# Patient Record
Sex: FEMALE | Race: BLACK OR AFRICAN AMERICAN | NOT HISPANIC OR LATINO | URBAN - METROPOLITAN AREA
[De-identification: names, ages, dates, MRNs, and addresses within clinical notes are randomized per-mention and may not be internally consistent; named-entity substitution may affect disease eponyms.]

---

## 2017-02-22 ENCOUNTER — INPATIENT (INPATIENT)
Facility: HOSPITAL | Age: 57
LOS: 0 days | Discharge: HOME | DRG: 313 | End: 2017-02-23
Attending: INTERNAL MEDICINE | Admitting: INTERNAL MEDICINE
Payer: COMMERCIAL

## 2017-02-22 VITALS
SYSTOLIC BLOOD PRESSURE: 128 MMHG | DIASTOLIC BLOOD PRESSURE: 76 MMHG | RESPIRATION RATE: 16 BRPM | HEART RATE: 62 BPM | OXYGEN SATURATION: 100 % | TEMPERATURE: 98 F

## 2017-02-22 DIAGNOSIS — E11.9 TYPE 2 DIABETES MELLITUS WITHOUT COMPLICATIONS: ICD-10-CM

## 2017-02-22 DIAGNOSIS — R07.9 CHEST PAIN, UNSPECIFIED: ICD-10-CM

## 2017-02-22 DIAGNOSIS — Z98.89 OTHER SPECIFIED POSTPROCEDURAL STATES: Chronic | ICD-10-CM

## 2017-02-22 DIAGNOSIS — Z98.890 OTHER SPECIFIED POSTPROCEDURAL STATES: Chronic | ICD-10-CM

## 2017-02-22 DIAGNOSIS — I10 ESSENTIAL (PRIMARY) HYPERTENSION: ICD-10-CM

## 2017-02-22 LAB
ALBUMIN SERPL ELPH-MCNC: 3.5 G/DL — SIGNIFICANT CHANGE UP (ref 3.4–5)
ALP SERPL-CCNC: 86 U/L — SIGNIFICANT CHANGE UP (ref 40–120)
ALT FLD-CCNC: 26 U/L — SIGNIFICANT CHANGE UP (ref 12–42)
ANION GAP SERPL CALC-SCNC: 9 MMOL/L — SIGNIFICANT CHANGE UP (ref 9–16)
AST SERPL-CCNC: 26 U/L — SIGNIFICANT CHANGE UP (ref 15–37)
BASOPHILS NFR BLD AUTO: 0.4 % — SIGNIFICANT CHANGE UP (ref 0–2)
BILIRUB SERPL-MCNC: 0.3 MG/DL — SIGNIFICANT CHANGE UP (ref 0.2–1.2)
BUN SERPL-MCNC: 18 MG/DL — SIGNIFICANT CHANGE UP (ref 7–23)
CALCIUM SERPL-MCNC: 9 MG/DL — SIGNIFICANT CHANGE UP (ref 8.5–10.5)
CHLORIDE SERPL-SCNC: 108 MMOL/L — SIGNIFICANT CHANGE UP (ref 96–108)
CK MB CFR SERPL CALC: 2 NG/ML — SIGNIFICANT CHANGE UP (ref 0.5–3.6)
CK MB CFR SERPL CALC: 2.3 NG/ML — SIGNIFICANT CHANGE UP (ref 0.5–3.6)
CK SERPL-CCNC: 209 U/L — HIGH (ref 26–192)
CK SERPL-CCNC: 227 U/L — HIGH (ref 26–192)
CO2 SERPL-SCNC: 25 MMOL/L — SIGNIFICANT CHANGE UP (ref 22–31)
CREAT SERPL-MCNC: 0.71 MG/DL — SIGNIFICANT CHANGE UP (ref 0.5–1.3)
D DIMER BLD IA.RAPID-MCNC: <150 NG/ML DDU — SIGNIFICANT CHANGE UP
EOSINOPHIL NFR BLD AUTO: 1.9 % — SIGNIFICANT CHANGE UP (ref 0–6)
GLUCOSE SERPL-MCNC: 87 MG/DL — SIGNIFICANT CHANGE UP (ref 70–99)
HCG SERPL-ACNC: 2 MIU/ML — SIGNIFICANT CHANGE UP
HCT VFR BLD CALC: 38 % — SIGNIFICANT CHANGE UP (ref 34.5–45)
HGB BLD-MCNC: 13 G/DL — SIGNIFICANT CHANGE UP (ref 11.5–15.5)
LYMPHOCYTES # BLD AUTO: 33.3 % — SIGNIFICANT CHANGE UP (ref 13–44)
MCHC RBC-ENTMCNC: 28 PG — SIGNIFICANT CHANGE UP (ref 27–34)
MCHC RBC-ENTMCNC: 34.2 G/DL — SIGNIFICANT CHANGE UP (ref 32–36)
MCV RBC AUTO: 81.9 FL — SIGNIFICANT CHANGE UP (ref 80–100)
MONOCYTES NFR BLD AUTO: 5.8 % — SIGNIFICANT CHANGE UP (ref 2–14)
NEUTROPHILS NFR BLD AUTO: 58.6 % — SIGNIFICANT CHANGE UP (ref 43–77)
PLATELET # BLD AUTO: 179 K/UL — SIGNIFICANT CHANGE UP (ref 150–400)
POTASSIUM SERPL-MCNC: 4.2 MMOL/L — SIGNIFICANT CHANGE UP (ref 3.5–5.3)
POTASSIUM SERPL-SCNC: 4.2 MMOL/L — SIGNIFICANT CHANGE UP (ref 3.5–5.3)
PROT SERPL-MCNC: 7.2 G/DL — SIGNIFICANT CHANGE UP (ref 6.4–8.2)
RBC # BLD: 4.64 M/UL — SIGNIFICANT CHANGE UP (ref 3.8–5.2)
RBC # FLD: 14.5 % — SIGNIFICANT CHANGE UP (ref 10.3–16.9)
SODIUM SERPL-SCNC: 142 MMOL/L — SIGNIFICANT CHANGE UP (ref 135–145)
TROPONIN I SERPL-MCNC: <0.015 NG/ML — SIGNIFICANT CHANGE UP (ref 0.01–0.04)
WBC # BLD: 5.1 K/UL — SIGNIFICANT CHANGE UP (ref 3.8–10.5)
WBC # FLD AUTO: 5.1 K/UL — SIGNIFICANT CHANGE UP (ref 3.8–10.5)

## 2017-02-22 PROCEDURE — 93010 ELECTROCARDIOGRAM REPORT: CPT

## 2017-02-22 PROCEDURE — 99285 EMERGENCY DEPT VISIT HI MDM: CPT | Mod: 25

## 2017-02-22 PROCEDURE — 71020: CPT | Mod: 26

## 2017-02-22 RX ORDER — ASPIRIN/CALCIUM CARB/MAGNESIUM 324 MG
81 TABLET ORAL DAILY
Qty: 0 | Refills: 0 | Status: DISCONTINUED | OUTPATIENT
Start: 2017-02-23 | End: 2017-02-23

## 2017-02-22 RX ORDER — ASPIRIN/CALCIUM CARB/MAGNESIUM 324 MG
325 TABLET ORAL ONCE
Qty: 0 | Refills: 0 | Status: DISCONTINUED | OUTPATIENT
Start: 2017-02-22 | End: 2017-02-22

## 2017-02-22 RX ORDER — ACETAMINOPHEN 500 MG
650 TABLET ORAL ONCE
Qty: 0 | Refills: 0 | Status: COMPLETED | OUTPATIENT
Start: 2017-02-22 | End: 2017-02-22

## 2017-02-22 RX ADMIN — Medication 650 MILLIGRAM(S): at 18:06

## 2017-02-22 NOTE — PROGRESS NOTE ADULT - SUBJECTIVE AND OBJECTIVE BOX
Pt is admitted for R sided chest pain    Previous cardiac work up has been negative yrs ago    Vital Signs Last 24 Hrs  T(C): 36.4, Max: 37 (02-22 @ 12:00)  T(F): 97.6, Max: 98.6 (02-22 @ 12:00)  HR: 63 (56 - 63)  BP: 128/59 (125/75 - 128/76)  BP(mean): 91 (91 - 91)  RR: 16 (16 - 20)  SpO2: 100% (100% - 100%)    PAST MEDICAL & SURGICAL HISTORY:  HTN (hypertension)  Avascular necrosis of bone  DM type 2 (diabetes mellitus, type 2)  H/O cosmetic surgery: Breast reduction and abdominoplasty  S/P wrist surgery: bone graft in L arm  No significant past surgical history  No significant past surgical history      MEDICATIONS  (STANDING):    MEDICATIONS  (PRN):      Lung clear  CV s1 s2  Abd soft  Ext stable                          13.0   5.1   )-----------( 179      ( 22 Feb 2017 09:29 )             38.0   22 Feb 2017 09:29    142    |  108    |  18     ----------------------------<  87     4.2     |  25     |  0.71     Ca    9.0        22 Feb 2017 09:29    TPro  7.2    /  Alb  3.5    /  TBili  0.3    /  DBili  x      /  AST  26     /  ALT  26     /  AlkPhos  86     22 Feb 2017 09:29  CARDIAC MARKERS ( 22 Feb 2017 16:07 )  <0.015 ng/mL / x     / 227 U/L / x     / 2.0 ng/mL  CARDIAC MARKERS ( 22 Feb 2017 09:29 )  <0.015 ng/mL / x     / x     / x     / x      cxr clear lungs   dilate bowel loops     ekg NSR nSB no acute change

## 2017-02-22 NOTE — H&P ADULT. - HISTORY OF PRESENT ILLNESS
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  Pt states she had similar episode several years ago with negative workup.  Current symptoms started yesterday, 2 episodes, at its worst was 7/10, lasted 15-30sec, relieved with rest, currently asymptomatic.  Today she had another episode at work so came to ER.  Since arrival to ER she states two more episodes.  She reports being under increased job and personal related stress in last several months but had no CP symptoms until yesterday’s episode.  No associated palpitations, nausea, vomiting, diaphoresis, dizziness, syncope, recent orthopnea/pnd, pedal edema, or decreased activity tolerance walking up or down stairs at home.  She reports having EKG and ECHO done at last PMD visit 2 weeks (reports requested).  In ER, VSS /70, HR 50s-60s, EKG SB no ischemia, O2 Sat 98% on RA, Afebrile. Trop neg x1, D-Dimer neg. Took ASA 325mg at home.  Pt is now being admitted for further cardiac evaluation to include telemetry monitoring, and stress testing.

## 2017-02-22 NOTE — ED ADULT NURSE NOTE - OBJECTIVE STATEMENT
Patient presents in OR scrubs with cap, called 911 from work for "burning pain" points to shoulder and right side of the back, also radiates to front chest on right side.  Patient given 325 ASA prior to arrival.  Denies any other aura or symptoms.  EKG performed in triage.  Patient placed on continuous cardiac monitoring and vital signs set.  Provider to assess.

## 2017-02-22 NOTE — H&P ADULT. - PROBLEM SELECTOR PLAN 2
-currently normotensive on no medication  -Monitor BP with VS for BP trend and consider ACEI if elevated   -Reinforced Diet/Lifestyle modification with patient.

## 2017-02-22 NOTE — H&P ADULT. - FAMILY HISTORY
Sibling  Still living? Yes, Estimated age: 51-60  Family history of CVA, Age at diagnosis: 41-50     Father  Still living? No  Family history of colon cancer, Age at diagnosis: 71-80

## 2017-02-22 NOTE — ED ADULT TRIAGE NOTE - CHIEF COMPLAINT QUOTE
Pt c/o mid chest pain radiating to R arm since yesterday, described as burning. Hx of asthma. No SOB.

## 2017-02-22 NOTE — ED PROVIDER NOTE - OBJECTIVE STATEMENT
55 yo female with right sided CP  x 2 days- no pleuritic pain no narciso SOB or LOYA  no PND - recent cold 2 weeks ago  was placed -on abx and 55 yo female with right sided CP  x 2 days- no pleuritic pain no narciso SOB or LOYA  no PND - recent cold 2 weeks ago  was placed -on abx and antihistamines--no prior hx of MI  non smoker ? remote DM in past ? HTN -min cough no fevers or chills - no vomiting or diarrhea - no hx of trauma falls arthritis bursitis in past- no leg pain or calf swelling  no prior hx of DVT or PE  non smoker last stress test was 2 years ago 57 yo female with right sided CP  since 6 am on the way to work- no pleuritic pain no narciso SOB or LOYA  no PND - recent cold 2 weeks ago  was placed -on abx and antihistamines--no prior hx of MI  non smoker ? remote DM in past ? HTN -min cough no fevers or chills - no vomiting or diarrhea - no hx of trauma falls arthritis bursitis in past- no leg pain or calf swelling  no prior hx of DVT or PE  non smoker last stress test was 2 years ago

## 2017-02-22 NOTE — ED ADULT NURSE NOTE - CHPI ED SYMPTOMS NEG
no shortness of breath/no dizziness/no syncope/no fever/no vomiting/no nausea/no diaphoresis/no chills/no cough

## 2017-02-22 NOTE — H&P ADULT. - PROBLEM SELECTOR PLAN 1
-currently cp free  -F/U 2nd set of Michael 2pm and 8pm.   -Continue ASA   -F/U Lipid panel in AM  -NPO after MN for ETT in AM

## 2017-02-22 NOTE — ED PROVIDER NOTE - MEDICAL DECISION MAKING DETAILS
57 yo female with HTN DM with right sided CP  x 4 hrs pressure-like numbness slight sob  neg trop and d-dimer- d/w  dr loyola prefers admission and serial trops and nuclear stress testing 57 yo female with HTN DM with right sided CP  x 4 hrs pressure-like numbness slight sob  neg trop and d-dimer- d/w  dr loyola prefers admission and serial trops and nuclear stress testing as opposed to Coronary CTA

## 2017-02-22 NOTE — H&P ADULT. - ASSESSMENT
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

## 2017-02-23 VITALS — TEMPERATURE: 97 F

## 2017-02-23 LAB
ANION GAP SERPL CALC-SCNC: 6 MMOL/L — LOW (ref 9–16)
BASOPHILS NFR BLD AUTO: 0.4 % — SIGNIFICANT CHANGE UP (ref 0–2)
BUN SERPL-MCNC: 17 MG/DL — SIGNIFICANT CHANGE UP (ref 7–23)
CALCIUM SERPL-MCNC: 9.3 MG/DL — SIGNIFICANT CHANGE UP (ref 8.5–10.5)
CHLORIDE SERPL-SCNC: 107 MMOL/L — SIGNIFICANT CHANGE UP (ref 96–108)
CK MB CFR SERPL CALC: 2 NG/ML — SIGNIFICANT CHANGE UP (ref 0.5–3.6)
CK SERPL-CCNC: 186 U/L — SIGNIFICANT CHANGE UP (ref 26–192)
CO2 SERPL-SCNC: 28 MMOL/L — SIGNIFICANT CHANGE UP (ref 22–31)
CREAT SERPL-MCNC: 0.86 MG/DL — SIGNIFICANT CHANGE UP (ref 0.5–1.3)
EOSINOPHIL NFR BLD AUTO: 3 % — SIGNIFICANT CHANGE UP (ref 0–6)
GLUCOSE SERPL-MCNC: 79 MG/DL — SIGNIFICANT CHANGE UP (ref 70–99)
HCT VFR BLD CALC: 37.3 % — SIGNIFICANT CHANGE UP (ref 34.5–45)
HGB BLD-MCNC: 12.4 G/DL — SIGNIFICANT CHANGE UP (ref 11.5–15.5)
LYMPHOCYTES # BLD AUTO: 45.9 % — HIGH (ref 13–44)
MCHC RBC-ENTMCNC: 27.4 PG — SIGNIFICANT CHANGE UP (ref 27–34)
MCHC RBC-ENTMCNC: 33.2 G/DL — SIGNIFICANT CHANGE UP (ref 32–36)
MCV RBC AUTO: 82.5 FL — SIGNIFICANT CHANGE UP (ref 80–100)
MONOCYTES NFR BLD AUTO: 5.9 % — SIGNIFICANT CHANGE UP (ref 2–14)
NEUTROPHILS NFR BLD AUTO: 44.8 % — SIGNIFICANT CHANGE UP (ref 43–77)
PLATELET # BLD AUTO: 178 K/UL — SIGNIFICANT CHANGE UP (ref 150–400)
POTASSIUM SERPL-MCNC: 4 MMOL/L — SIGNIFICANT CHANGE UP (ref 3.5–5.3)
POTASSIUM SERPL-SCNC: 4 MMOL/L — SIGNIFICANT CHANGE UP (ref 3.5–5.3)
RBC # BLD: 4.52 M/UL — SIGNIFICANT CHANGE UP (ref 3.8–5.2)
RBC # FLD: 14.6 % — SIGNIFICANT CHANGE UP (ref 10.3–16.9)
SODIUM SERPL-SCNC: 141 MMOL/L — SIGNIFICANT CHANGE UP (ref 135–145)
TROPONIN I SERPL-MCNC: <0.015 NG/ML — SIGNIFICANT CHANGE UP (ref 0.01–0.04)
WBC # BLD: 4.7 K/UL — SIGNIFICANT CHANGE UP (ref 3.8–10.5)
WBC # FLD AUTO: 4.7 K/UL — SIGNIFICANT CHANGE UP (ref 3.8–10.5)

## 2017-02-23 PROCEDURE — 93017 CV STRESS TEST TRACING ONLY: CPT

## 2017-02-23 PROCEDURE — 93306 TTE W/DOPPLER COMPLETE: CPT | Mod: 26

## 2017-02-23 PROCEDURE — 93016 CV STRESS TEST SUPVJ ONLY: CPT

## 2017-02-23 PROCEDURE — A9505: CPT

## 2017-02-23 PROCEDURE — 84484 ASSAY OF TROPONIN QUANT: CPT

## 2017-02-23 PROCEDURE — 85025 COMPLETE CBC W/AUTO DIFF WBC: CPT

## 2017-02-23 PROCEDURE — 80053 COMPREHEN METABOLIC PANEL: CPT

## 2017-02-23 PROCEDURE — 93018 CV STRESS TEST I&R ONLY: CPT

## 2017-02-23 PROCEDURE — 82550 ASSAY OF CK (CPK): CPT

## 2017-02-23 PROCEDURE — 71046 X-RAY EXAM CHEST 2 VIEWS: CPT

## 2017-02-23 PROCEDURE — 82553 CREATINE MB FRACTION: CPT

## 2017-02-23 PROCEDURE — 99285 EMERGENCY DEPT VISIT HI MDM: CPT | Mod: 25

## 2017-02-23 PROCEDURE — 93005 ELECTROCARDIOGRAM TRACING: CPT

## 2017-02-23 PROCEDURE — 80048 BASIC METABOLIC PNL TOTAL CA: CPT

## 2017-02-23 PROCEDURE — 78452 HT MUSCLE IMAGE SPECT MULT: CPT

## 2017-02-23 PROCEDURE — A9500: CPT

## 2017-02-23 PROCEDURE — 85379 FIBRIN DEGRADATION QUANT: CPT

## 2017-02-23 PROCEDURE — 93306 TTE W/DOPPLER COMPLETE: CPT

## 2017-02-23 PROCEDURE — 36415 COLL VENOUS BLD VENIPUNCTURE: CPT

## 2017-02-23 PROCEDURE — 78452 HT MUSCLE IMAGE SPECT MULT: CPT | Mod: 26

## 2017-02-23 PROCEDURE — 84702 CHORIONIC GONADOTROPIN TEST: CPT

## 2017-02-23 RX ORDER — ASPIRIN/CALCIUM CARB/MAGNESIUM 324 MG
1 TABLET ORAL
Qty: 0 | Refills: 0 | DISCHARGE
Start: 2017-02-23

## 2017-02-23 RX ADMIN — Medication 81 MILLIGRAM(S): at 11:34

## 2017-02-23 NOTE — DISCHARGE NOTE ADULT - CARE PROVIDER_API CALL
MARCE FOLEY  Address: [address] [place] [address]  Phone: [phone]  Phone: (   )    -  Fax: (       -

## 2017-02-23 NOTE — PROGRESS NOTE ADULT - SUBJECTIVE AND OBJECTIVE BOX
Pt is stable   No cp  No dyspnea    PAST MEDICAL & SURGICAL HISTORY:  HTN (hypertension)  Avascular necrosis of bone  DM type 2 (diabetes mellitus, type 2)  H/O cosmetic surgery: Breast reduction and abdominoplasty  S/P wrist surgery: bone graft in L arm  No significant past surgical history  No significant past surgical history    MEDICATIONS  (STANDING):  aspirin enteric coated 81milliGRAM(s) Oral daily    MEDICATIONS  (PRN):    Vital Signs Last 24 Hrs  T(C): 36.1, Max: 37 (02-22 @ 12:00)  T(F): 97, Max: 98.6 (02-22 @ 12:00)  HR: 53 (49 - 66)  BP: 130/60 (108/61 - 144/65)  BP(mean): 78 (78 - 93)  RR: 16 (14 - 16)  SpO2: 100% (97% - 100%)    Lungs clear   Cv s1 s2  Abd soft  Ext stable                          12.4   4.7   )-----------( 178      ( 23 Feb 2017 07:14 )    23 Feb 2017 07:14    141    |  107    |  17     ----------------------------<  79     4.0     |  28     |  0.86     Ca    9.3        23 Feb 2017 07:14    TPro  7.2    /  Alb  3.5    /  TBili  0.3    /  DBili  x      /  AST  26     /  ALT  26     /  AlkPhos  86     22 Feb 2017 09:29  CXR Clear lungs               37.3

## 2017-02-23 NOTE — DISCHARGE NOTE ADULT - CARE PLAN
Principal Discharge DX:	Chest pain  Goal:	You came in with chest pain and were found to have cardiac enzymes which were negative as well as an Echocardiogram and stress test which were both normal.  Instructions for follow-up, activity and diet:	Follow up with your outpatient Cardiologist Dr. Burger in [place] next week. Principal Discharge DX:	Chest pain  Goal:	You came in with chest pain and were found to have cardiac enzymes which were negative as well as an Echocardiogram and stress test which were both normal.  Instructions for follow-up, activity and diet:	Follow up with your outpatient Cardiologist Dr. Burger in [place] next week.

## 2017-02-23 NOTE — DISCHARGE NOTE ADULT - PATIENT PORTAL LINK FT
“You can access the [facility] Patient Portal, offered by [facility], by registering with the following website: [email or web address]”

## 2017-02-23 NOTE — DISCHARGE NOTE ADULT - MEDICATION SUMMARY - MEDICATIONS TO TAKE
I will START or STAY ON the medications listed below when I get home from the hospital:    aspirin 81 mg oral delayed release tablet  -- 1 tab(s) by mouth once a day  -- Indication: For Chest pain    Advair Diskus  --  inhaled   -- Indication: For Home inhaler

## 2017-02-23 NOTE — DISCHARGE NOTE ADULT - PROVIDER TOKENS
FREE:[LAST:[FOLEY],FIRST:[MARCE],PHONE:[(   )    -],FAX:[(   )    -],ADDRESS:[Address: [address] [place] [address]  Phone: [phone]]]

## 2017-02-23 NOTE — DISCHARGE NOTE ADULT - PLAN OF CARE
You came in with chest pain and were found to have cardiac enzymes which were negative as well as an Echocardiogram and stress test which were both normal. Follow up with your outpatient Cardiologist Dr. Burger in [place] next week.

## 2017-02-23 NOTE — DISCHARGE NOTE ADULT - HOSPITAL COURSE
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  Pt states she had similar episode several years ago with negative workup.  Current symptoms started yesterday, 2 episodes, at its worst was 7/10, lasted 15-30sec, relieved with rest, currently asymptomatic.  Today she had another episode at work so came to ER.  Since arrival to ER she states two more episodes.  She reports being under increased job and personal related stress in last several months but had no CP symptoms until yesterday’s episode.  No associated palpitations, nausea, vomiting, diaphoresis, dizziness, syncope, recent orthopnea/pnd, pedal edema, or decreased activity tolerance walking up or down stairs at home.  She reports having EKG and ECHO done at last PMD visit 2 weeks (reports requested).  In ER, VSS /70, HR 50s-60s, EKG SB no ischemia, O2 Sat 98% on RA, Afebrile. Trop neg x1, D-Dimer neg. Took ASA 325mg at home.  Pt is now being admitted for further cardiac evaluation to include telemetry monitoring, and stress testing. Pt ruled out with CE negative x 4, Echo normal wall motion, EF 55%, no valvular disease.  Exercise Stress was also normal and pt to f/u in 1 week with her Cardiologist and Internist Dr. Burger in [place].  Pt stable for d/c as d/w Dr. Gonzalez.

## 2017-02-27 DIAGNOSIS — R03.0 ELEVATED BLOOD-PRESSURE READING, WITHOUT DIAGNOSIS OF HYPERTENSION: ICD-10-CM

## 2017-02-27 DIAGNOSIS — R07.9 CHEST PAIN, UNSPECIFIED: ICD-10-CM

## 2017-02-27 DIAGNOSIS — Z28.21 IMMUNIZATION NOT CARRIED OUT BECAUSE OF PATIENT REFUSAL: ICD-10-CM

## 2017-02-27 DIAGNOSIS — M87.9 OSTEONECROSIS, UNSPECIFIED: ICD-10-CM

## 2017-02-27 DIAGNOSIS — E11.9 TYPE 2 DIABETES MELLITUS WITHOUT COMPLICATIONS: ICD-10-CM

## 2017-07-17 ENCOUNTER — EMERGENCY (EMERGENCY)
Facility: HOSPITAL | Age: 57
LOS: 1 days | Discharge: HOME | End: 2017-07-17
Attending: EMERGENCY MEDICINE | Admitting: EMERGENCY MEDICINE
Payer: COMMERCIAL

## 2017-07-17 VITALS
SYSTOLIC BLOOD PRESSURE: 126 MMHG | HEART RATE: 60 BPM | TEMPERATURE: 98 F | DIASTOLIC BLOOD PRESSURE: 64 MMHG | WEIGHT: 203.05 LBS | RESPIRATION RATE: 16 BRPM | OXYGEN SATURATION: 100 %

## 2017-07-17 DIAGNOSIS — E11.9 TYPE 2 DIABETES MELLITUS WITHOUT COMPLICATIONS: ICD-10-CM
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
## 2017-07-17 DIAGNOSIS — I10 ESSENTIAL (PRIMARY) HYPERTENSION: ICD-10-CM

## 2017-07-17 DIAGNOSIS — Z79.899 OTHER LONG TERM (CURRENT) DRUG THERAPY: ICD-10-CM

## 2017-07-17 DIAGNOSIS — R51 HEADACHE: ICD-10-CM
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
## 2017-07-17 DIAGNOSIS — M54.9 DORSALGIA, UNSPECIFIED: ICD-10-CM

## 2017-07-17 DIAGNOSIS — Z98.890 OTHER SPECIFIED POSTPROCEDURAL STATES: Chronic | ICD-10-CM

## 2017-07-17 DIAGNOSIS — M54.2 CERVICALGIA: ICD-10-CM
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
## 2017-07-17 DIAGNOSIS — Z98.89 OTHER SPECIFIED POSTPROCEDURAL STATES: Chronic | ICD-10-CM

## 2017-07-17 DIAGNOSIS — Z79.82 LONG TERM (CURRENT) USE OF ASPIRIN: ICD-10-CM

## 2017-07-17 DIAGNOSIS — M25.561 PAIN IN RIGHT KNEE: ICD-10-CM

## 2017-07-17 PROCEDURE — 99283 EMERGENCY DEPT VISIT LOW MDM: CPT

## 2017-07-17 RX ORDER — IBUPROFEN 200 MG
400 TABLET ORAL ONCE
Qty: 0 | Refills: 0 | Status: COMPLETED | OUTPATIENT
Start: 2017-07-17 | End: 2017-07-17

## 2017-07-17 RX ADMIN — Medication 400 MILLIGRAM(S): at 14:57

## 2017-07-17 NOTE — ED PROVIDER NOTE - ATTENDING CONTRIBUTION TO CARE
pt seen and examined with me, agree with above. 56 yo female with slip and fall this morning.  co pain over left lower back and hip and hit head.  no headache, no loc.  on exam mildly tender left lower back nt midline spine, able to ambulate.  no indication for xrays at this time.  will give motrin, fu pmd

## 2017-07-17 NOTE — ED PROVIDER NOTE - HEAD, MLM
Head is atraumatic. Head shape is symmetrical.  No midline tenderness on the cervical spine.  Paraspinal tenderness

## 2017-07-17 NOTE — ED PROVIDER NOTE - OBJECTIVE STATEMENT
57F pmhx of syncope with a loop recorder in place here after a mechanical fall onto her right hip.  She says she was at work and thinks she slipped on something, did not lose consciousness or have any chest pain, palpitations, SOB or predromal symptoms.  She remembers the event, she landed on her right hip, right knee and braced herself with her right arm.  She said she hit her head on the way down.  She has felt lightheaded since the incident and was tearful on exam.  She says she has a minor headache and knee pain when she "pushes on it" but feels out of it.

## 2017-07-17 NOTE — ED PROVIDER NOTE - CARE PLAN
Principal Discharge DX:	Fall  Instructions for follow-up, activity and diet:	Mechanical fall without serious injury or fractures.

## 2017-07-17 NOTE — ED PROVIDER NOTE - MEDICAL DECISION MAKING DETAILS
No evidence of syncope, hypoglycemia, or seizure.  She has no significant injuries and improved after motrin.

## 2017-11-20 ENCOUNTER — OUTPATIENT (OUTPATIENT)
Dept: OUTPATIENT SERVICES | Facility: HOSPITAL | Age: 57
LOS: 1 days | End: 2017-11-20

## 2017-11-20 DIAGNOSIS — Z98.89 OTHER SPECIFIED POSTPROCEDURAL STATES: Chronic | ICD-10-CM

## 2017-11-20 DIAGNOSIS — Z00.00 ENCOUNTER FOR GENERAL ADULT MEDICAL EXAMINATION WITHOUT ABNORMAL FINDINGS: ICD-10-CM

## 2017-11-20 DIAGNOSIS — Z98.890 OTHER SPECIFIED POSTPROCEDURAL STATES: Chronic | ICD-10-CM

## 2018-01-01 NOTE — ED PROVIDER NOTE - TOBACCO USE
Never smoker
I will START or STAY ON the medications listed below when I get home from the hospital:    Tri-Vi-Sol oral liquid  -- 1 milliliter(s) by mouth once a day   -- Indication: For multivitamin

## 2018-10-03 ENCOUNTER — OUTPATIENT (OUTPATIENT)
Dept: OUTPATIENT SERVICES | Facility: HOSPITAL | Age: 58
LOS: 1 days | End: 2018-10-03

## 2018-10-03 DIAGNOSIS — Z00.00 ENCOUNTER FOR GENERAL ADULT MEDICAL EXAMINATION WITHOUT ABNORMAL FINDINGS: ICD-10-CM

## 2018-10-03 DIAGNOSIS — Z98.89 OTHER SPECIFIED POSTPROCEDURAL STATES: Chronic | ICD-10-CM

## 2018-10-03 DIAGNOSIS — Z98.890 OTHER SPECIFIED POSTPROCEDURAL STATES: Chronic | ICD-10-CM

## 2018-11-13 ENCOUNTER — OUTPATIENT (OUTPATIENT)
Dept: OUTPATIENT SERVICES | Facility: HOSPITAL | Age: 58
LOS: 1 days | End: 2018-11-13

## 2018-11-13 DIAGNOSIS — Z98.890 OTHER SPECIFIED POSTPROCEDURAL STATES: Chronic | ICD-10-CM

## 2018-11-13 DIAGNOSIS — Z98.89 OTHER SPECIFIED POSTPROCEDURAL STATES: Chronic | ICD-10-CM

## 2018-11-13 DIAGNOSIS — Z00.00 ENCOUNTER FOR GENERAL ADULT MEDICAL EXAMINATION WITHOUT ABNORMAL FINDINGS: ICD-10-CM

## 2019-06-14 NOTE — ED PROVIDER NOTE - NEURO NEGATIVE STATEMENT, MLM
Depth Of Biopsy: dermis Anesthesia Type: 1% lidocaine with epinephrine Render Post-Care Instructions In Note?: no Biopsy Method: Dermablade Billing Type: Third-Party Bill Size Of Lesion In Cm: 0 Electrodesiccation Text: The wound bed was treated with electrodesiccation after the biopsy was performed. Type Of Destruction Used: Curettage Curettage Text: The wound bed was treated with curettage after the biopsy was performed. Electrodesiccation And Curettage Text: The wound bed was treated with electrodesiccation and curettage after the biopsy was performed. Cryotherapy Text: The wound bed was treated with cryotherapy after the biopsy was performed. Post-Care Instructions: I reviewed with the patient in detail post-care instructions. Patient is to keep the biopsy site dry overnight, and then apply bacitracin twice daily until healed. Patient may apply hydrogen peroxide soaks to remove any crusting. Dressing: bandage Notification Instructions: Patient will be notified of biopsy results. However, patient instructed to call the office if not contacted within 2 weeks. Wound Care: Petrolatum Hemostasis: Drysol Anesthesia Volume In Cc: 0.5 Consent: Written consent was obtained and risks were reviewed including but not limited to scarring, infection, bleeding, scabbing, incomplete removal, nerve damage and allergy to anesthesia. Silver Nitrate Text: The wound bed was treated with silver nitrate after the biopsy was performed. Biopsy Type: H and E Detail Level: Detailed Was A Bandage Applied: Yes no loss of consciousness, no gait abnormality, no headache, no sensory deficits, and no weakness.

## 2019-08-12 ENCOUNTER — EMERGENCY (EMERGENCY)
Facility: HOSPITAL | Age: 59
LOS: 1 days | Discharge: HOME | End: 2019-08-12
Attending: EMERGENCY MEDICINE | Admitting: EMERGENCY MEDICINE
Payer: COMMERCIAL

## 2019-08-12 VITALS
TEMPERATURE: 98 F | HEART RATE: 55 BPM | RESPIRATION RATE: 18 BRPM | SYSTOLIC BLOOD PRESSURE: 123 MMHG | DIASTOLIC BLOOD PRESSURE: 77 MMHG | OXYGEN SATURATION: 100 %

## 2019-08-12 VITALS
WEIGHT: 195.11 LBS | HEART RATE: 73 BPM | SYSTOLIC BLOOD PRESSURE: 154 MMHG | HEIGHT: 66 IN | OXYGEN SATURATION: 100 % | DIASTOLIC BLOOD PRESSURE: 83 MMHG | TEMPERATURE: 98 F | RESPIRATION RATE: 18 BRPM

## 2019-08-12 DIAGNOSIS — Z98.89 OTHER SPECIFIED POSTPROCEDURAL STATES: Chronic | ICD-10-CM

## 2019-08-12 DIAGNOSIS — Z98.890 OTHER SPECIFIED POSTPROCEDURAL STATES: Chronic | ICD-10-CM

## 2019-08-12 LAB
ALBUMIN SERPL ELPH-MCNC: 4.3 G/DL — SIGNIFICANT CHANGE UP (ref 3.3–5)
ALP SERPL-CCNC: 82 U/L — SIGNIFICANT CHANGE UP (ref 40–120)
ALT FLD-CCNC: 20 U/L — SIGNIFICANT CHANGE UP (ref 10–45)
ANION GAP SERPL CALC-SCNC: 8 MMOL/L — SIGNIFICANT CHANGE UP (ref 5–17)
APTT BLD: 23.6 SEC — LOW (ref 27.5–36.3)
AST SERPL-CCNC: 27 U/L — SIGNIFICANT CHANGE UP (ref 10–40)
BASOPHILS # BLD AUTO: 0.03 K/UL — SIGNIFICANT CHANGE UP (ref 0–0.2)
BASOPHILS NFR BLD AUTO: 0.5 % — SIGNIFICANT CHANGE UP (ref 0–2)
BILIRUB SERPL-MCNC: 0.3 MG/DL — SIGNIFICANT CHANGE UP (ref 0.2–1.2)
BUN SERPL-MCNC: 16 MG/DL — SIGNIFICANT CHANGE UP (ref 7–23)
CALCIUM SERPL-MCNC: 10 MG/DL — SIGNIFICANT CHANGE UP (ref 8.4–10.5)
CHLORIDE SERPL-SCNC: 105 MMOL/L — SIGNIFICANT CHANGE UP (ref 96–108)
CK MB CFR SERPL CALC: 7.8 NG/ML — HIGH (ref 0–6.7)
CK SERPL-CCNC: 460 U/L — HIGH (ref 25–170)
CO2 SERPL-SCNC: 25 MMOL/L — SIGNIFICANT CHANGE UP (ref 22–31)
CREAT SERPL-MCNC: 0.8 MG/DL — SIGNIFICANT CHANGE UP (ref 0.5–1.3)
EOSINOPHIL # BLD AUTO: 0.11 K/UL — SIGNIFICANT CHANGE UP (ref 0–0.5)
EOSINOPHIL NFR BLD AUTO: 1.9 % — SIGNIFICANT CHANGE UP (ref 0–6)
GLUCOSE SERPL-MCNC: 90 MG/DL — SIGNIFICANT CHANGE UP (ref 70–99)
HCT VFR BLD CALC: 39.7 % — SIGNIFICANT CHANGE UP (ref 34.5–45)
HGB BLD-MCNC: 13 G/DL — SIGNIFICANT CHANGE UP (ref 11.5–15.5)
IMM GRANULOCYTES NFR BLD AUTO: 0.2 % — SIGNIFICANT CHANGE UP (ref 0–1.5)
INR BLD: 0.97 — SIGNIFICANT CHANGE UP (ref 0.88–1.16)
LYMPHOCYTES # BLD AUTO: 1.94 K/UL — SIGNIFICANT CHANGE UP (ref 1–3.3)
LYMPHOCYTES # BLD AUTO: 34.2 % — SIGNIFICANT CHANGE UP (ref 13–44)
MAGNESIUM SERPL-MCNC: 1.9 MG/DL — SIGNIFICANT CHANGE UP (ref 1.6–2.6)
MCHC RBC-ENTMCNC: 27.8 PG — SIGNIFICANT CHANGE UP (ref 27–34)
MCHC RBC-ENTMCNC: 32.7 GM/DL — SIGNIFICANT CHANGE UP (ref 32–36)
MCV RBC AUTO: 85 FL — SIGNIFICANT CHANGE UP (ref 80–100)
MONOCYTES # BLD AUTO: 0.52 K/UL — SIGNIFICANT CHANGE UP (ref 0–0.9)
MONOCYTES NFR BLD AUTO: 9.2 % — SIGNIFICANT CHANGE UP (ref 2–14)
NEUTROPHILS # BLD AUTO: 3.06 K/UL — SIGNIFICANT CHANGE UP (ref 1.8–7.4)
NEUTROPHILS NFR BLD AUTO: 54 % — SIGNIFICANT CHANGE UP (ref 43–77)
NRBC # BLD: 0 /100 WBCS — SIGNIFICANT CHANGE UP (ref 0–0)
PLATELET # BLD AUTO: 207 K/UL — SIGNIFICANT CHANGE UP (ref 150–400)
POTASSIUM SERPL-MCNC: 4.4 MMOL/L — SIGNIFICANT CHANGE UP (ref 3.5–5.3)
POTASSIUM SERPL-SCNC: 4.4 MMOL/L — SIGNIFICANT CHANGE UP (ref 3.5–5.3)
PROT SERPL-MCNC: 7.4 G/DL — SIGNIFICANT CHANGE UP (ref 6–8.3)
PROTHROM AB SERPL-ACNC: 11 SEC — SIGNIFICANT CHANGE UP (ref 10–12.9)
RBC # BLD: 4.67 M/UL — SIGNIFICANT CHANGE UP (ref 3.8–5.2)
RBC # FLD: 14.5 % — SIGNIFICANT CHANGE UP (ref 10.3–14.5)
SODIUM SERPL-SCNC: 138 MMOL/L — SIGNIFICANT CHANGE UP (ref 135–145)
TROPONIN T SERPL-MCNC: <0.01 NG/ML — SIGNIFICANT CHANGE UP (ref 0–0.01)
WBC # BLD: 5.67 K/UL — SIGNIFICANT CHANGE UP (ref 3.8–10.5)
WBC # FLD AUTO: 5.67 K/UL — SIGNIFICANT CHANGE UP (ref 3.8–10.5)

## 2019-08-12 PROCEDURE — 96374 THER/PROPH/DIAG INJ IV PUSH: CPT

## 2019-08-12 PROCEDURE — 71046 X-RAY EXAM CHEST 2 VIEWS: CPT

## 2019-08-12 PROCEDURE — 83735 ASSAY OF MAGNESIUM: CPT

## 2019-08-12 PROCEDURE — 99285 EMERGENCY DEPT VISIT HI MDM: CPT

## 2019-08-12 PROCEDURE — 82550 ASSAY OF CK (CPK): CPT

## 2019-08-12 PROCEDURE — 36415 COLL VENOUS BLD VENIPUNCTURE: CPT

## 2019-08-12 PROCEDURE — 85730 THROMBOPLASTIN TIME PARTIAL: CPT

## 2019-08-12 PROCEDURE — 85610 PROTHROMBIN TIME: CPT

## 2019-08-12 PROCEDURE — 93010 ELECTROCARDIOGRAM REPORT: CPT

## 2019-08-12 PROCEDURE — 82553 CREATINE MB FRACTION: CPT

## 2019-08-12 PROCEDURE — 99284 EMERGENCY DEPT VISIT MOD MDM: CPT | Mod: 25

## 2019-08-12 PROCEDURE — 80053 COMPREHEN METABOLIC PANEL: CPT

## 2019-08-12 PROCEDURE — 93005 ELECTROCARDIOGRAM TRACING: CPT

## 2019-08-12 PROCEDURE — 71046 X-RAY EXAM CHEST 2 VIEWS: CPT | Mod: 26

## 2019-08-12 PROCEDURE — 84484 ASSAY OF TROPONIN QUANT: CPT

## 2019-08-12 PROCEDURE — 85025 COMPLETE CBC W/AUTO DIFF WBC: CPT

## 2019-08-12 RX ORDER — DILTIAZEM HCL 120 MG
1 CAPSULE, EXT RELEASE 24 HR ORAL
Qty: 0 | Refills: 0 | DISCHARGE

## 2019-08-12 RX ORDER — ACETAMINOPHEN 500 MG
650 TABLET ORAL ONCE
Refills: 0 | Status: COMPLETED | OUTPATIENT
Start: 2019-08-12 | End: 2019-08-12

## 2019-08-12 RX ORDER — FLUTICASONE PROPIONATE AND SALMETEROL 50; 250 UG/1; UG/1
0 POWDER ORAL; RESPIRATORY (INHALATION)
Qty: 0 | Refills: 0 | DISCHARGE

## 2019-08-12 RX ORDER — FAMOTIDINE 10 MG/ML
20 INJECTION INTRAVENOUS ONCE
Refills: 0 | Status: COMPLETED | OUTPATIENT
Start: 2019-08-12 | End: 2019-08-12

## 2019-08-12 RX ORDER — SODIUM CHLORIDE 9 MG/ML
1000 INJECTION INTRAMUSCULAR; INTRAVENOUS; SUBCUTANEOUS ONCE
Refills: 0 | Status: COMPLETED | OUTPATIENT
Start: 2019-08-12 | End: 2019-08-12

## 2019-08-12 RX ORDER — METOPROLOL TARTRATE 50 MG
1 TABLET ORAL
Qty: 0 | Refills: 0 | DISCHARGE

## 2019-08-12 RX ADMIN — Medication 650 MILLIGRAM(S): at 11:24

## 2019-08-12 RX ADMIN — FAMOTIDINE 20 MILLIGRAM(S): 10 INJECTION INTRAVENOUS at 11:24

## 2019-08-12 RX ADMIN — SODIUM CHLORIDE 1000 MILLILITER(S): 9 INJECTION INTRAMUSCULAR; INTRAVENOUS; SUBCUTANEOUS at 10:38

## 2019-08-12 NOTE — ED PROVIDER NOTE - CLINICAL SUMMARY MEDICAL DECISION MAKING FREE TEXT BOX
59F PMH pafib (diltiazem, no AC) p/w palpitations/cp. Pt w/ 2w of midsternal cp, described as a "knot," constant, non-radiating, non-exertional. 2d also had episode of vision going black/near syncope - pt was walking outside carrying bags then felt that way, lasted a few seconds then resolved, no actual LOC. Similar episode yesterday while cleaning house. Pt has had similar prior episodes, in 2017, states had loop recorder placed and was told she has afib. Pt states she had echo ~2mos ago wnl. Also c/o feeling heart racing, since this morning, intermittent. States she measure her BP and was 150s so came to ED. No other systemic symptoms. Vitals wnl, exam as above.   CP: Very low suspicion for ACS. Likely GERD/gastritis/PUD. clinically not PE, tamponade, dissection, PTX, perf, myocarditis, mediastinitis.   Lightheaded: Near syncope. Possible arrhythmia.   CK elevated 400s, other labs grossly wnl.   Extensive discussion w/ pt and offered admission given 2 episodes of near syncope. However, pt has good cards f/u and reportedly recent normal echo. Likely low clinical benefit of admission. Pt prefers to f/u outpt. Will check CXR, brief obs in ED, symptom control, reassess.

## 2019-08-12 NOTE — ED ADULT NURSE NOTE - OBJECTIVE STATEMENT
patient with Pmhx of a-fib complains of mid-sternal chest pain, non-radiating for the last 2 weeks with intermittent palpitations. she states that she "blacked out" on Saturday where she felt dizzy and saw black for a minute. she states that she has not seen a doctor for this because she thought the pain would get better. patient was concerned that these symptoms were not going away so came to ED for eval. denies n/v, abdominal pain, fever, chills. placed on cardiac monitor, labs drawn and sent, pending MD farooq. will continue to monitor.

## 2019-08-12 NOTE — ED PROVIDER NOTE - NSFOLLOWUPINSTRUCTIONS_ED_ALL_ED_FT
Stay well hydrated.  Return for fevers, persistent vomit, uncontrolled pain, worsening breathing, worsening lightheaded.  Follow up with primary doctor within 1-2 days.   Follow up with cardiologist within 1-2 days.   Take home medications as prescribed.     Palpitations    A palpitation is the feeling that your heartbeat is irregular or is faster than normal. It may feel like your heart is fluttering or skipping a beat. They may be caused by many things, including smoking, caffeine, alcohol, stress, and certain medicines. Although most causes of palpitations are not serious, palpitations can be a sign of a serious medical problem. Avoid caffeine, alcohol, and tobacco products at home. Try to reduce stress and anxiety and make sure to get plenty of rest.     SEEK IMMEDIATE MEDICAL CARE IF YOU HAVE ANY OF THE FOLLOWING SYMPTOMS: chest pain, shortness of breath, severe headache, dizziness/lightheadedness, or fainting.

## 2019-08-12 NOTE — ED PROVIDER NOTE - OBJECTIVE STATEMENT
59F PMH pafib (diltiazem, no AC) p/w palpitations/cp. Pt w/ 2w of midsternal cp, described as a "knot," constant, non-radiating, non-exertional. 2d also had episode of vision going black/near syncope - pt was walking outside carrying bags then felt that way, lasted a few seconds then resolved, no actual LOC. Similar episode yesterday while cleaning house. Pt has had similar prior episodes, in 2017, states had loop recorder placed and was told she has afib. Pt states she had echo ~2mos ago wnl. Also c/o feeling heart racing, since this morning, intermittent. Denies associated SOB, NVD, diaphoresis, cough/rhinorrhea, black/bloody stool, LE pain/swelling, focal weakness/numbness, recent travel/immobilization, abd pain, urinary complaints, f/c. No hormone use. No FMH CAD/clots/sudden death.

## 2019-08-12 NOTE — ED PROVIDER NOTE - PROGRESS NOTE DETAILS
Klepfish: no lightheaded, palpitations while in Ed. Again offered admission, pt prefers to go home, outpt pmd/cards f/u.

## 2019-08-16 DIAGNOSIS — R42 DIZZINESS AND GIDDINESS: ICD-10-CM

## 2019-08-16 DIAGNOSIS — R00.2 PALPITATIONS: ICD-10-CM

## 2020-04-26 ENCOUNTER — MESSAGE (OUTPATIENT)
Age: 60
End: 2020-04-26

## 2020-05-05 LAB
SARS-COV-2 IGG SERPL IA-ACNC: 5.5 RATIO
SARS-COV-2 IGG SERPL QL IA: POSITIVE

## 2020-06-08 PROBLEM — I48.91 UNSPECIFIED ATRIAL FIBRILLATION: Chronic | Status: ACTIVE | Noted: 2019-08-12

## 2020-06-12 PROBLEM — Z00.00 ENCOUNTER FOR PREVENTIVE HEALTH EXAMINATION: Status: ACTIVE | Noted: 2020-06-12

## 2020-06-16 ENCOUNTER — APPOINTMENT (OUTPATIENT)
Dept: ORTHOPEDIC SURGERY | Facility: CLINIC | Age: 60
End: 2020-06-16
Payer: COMMERCIAL

## 2020-06-16 VITALS — RESPIRATION RATE: 16 BRPM | HEIGHT: 67 IN | WEIGHT: 200 LBS | BODY MASS INDEX: 31.39 KG/M2

## 2020-06-16 DIAGNOSIS — Z78.9 OTHER SPECIFIED HEALTH STATUS: ICD-10-CM

## 2020-06-16 PROCEDURE — 20550 NJX 1 TENDON SHEATH/LIGAMENT: CPT | Mod: F8

## 2020-06-16 PROCEDURE — 73130 X-RAY EXAM OF HAND: CPT | Mod: 50

## 2020-06-16 PROCEDURE — 99203 OFFICE O/P NEW LOW 30 MIN: CPT | Mod: 25

## 2021-01-05 VITALS — HEIGHT: 67 IN | RESPIRATION RATE: 16 BRPM | WEIGHT: 200 LBS | BODY MASS INDEX: 31.39 KG/M2

## 2021-01-06 ENCOUNTER — APPOINTMENT (OUTPATIENT)
Dept: ORTHOPEDIC SURGERY | Facility: CLINIC | Age: 61
End: 2021-01-06
Payer: COMMERCIAL

## 2021-01-06 ENCOUNTER — TRANSCRIPTION ENCOUNTER (OUTPATIENT)
Age: 61
End: 2021-01-06

## 2021-01-06 VITALS — RESPIRATION RATE: 16 BRPM | BODY MASS INDEX: 31.39 KG/M2 | HEIGHT: 67 IN | WEIGHT: 200 LBS

## 2021-01-06 PROCEDURE — 99072 ADDL SUPL MATRL&STAF TM PHE: CPT

## 2021-01-06 PROCEDURE — 20550 NJX 1 TENDON SHEATH/LIGAMENT: CPT | Mod: F8

## 2021-01-06 PROCEDURE — 99214 OFFICE O/P EST MOD 30 MIN: CPT | Mod: 25

## 2021-06-08 ENCOUNTER — NON-APPOINTMENT (OUTPATIENT)
Age: 61
End: 2021-06-08

## 2021-07-27 NOTE — ED PROVIDER NOTE - ENDOCRINE NEGATIVE STATEMENT, MLM
- Call or MyChart us in 3 weeks to update us  - Return to clinic in 3 months for Botox injections   no diabetes and no thyroid trouble.

## 2021-10-14 ENCOUNTER — APPOINTMENT (OUTPATIENT)
Dept: ORTHOPEDIC SURGERY | Facility: CLINIC | Age: 61
End: 2021-10-14
Payer: COMMERCIAL

## 2021-10-14 VITALS — DIASTOLIC BLOOD PRESSURE: 76 MMHG | SYSTOLIC BLOOD PRESSURE: 134 MMHG | HEART RATE: 67 BPM

## 2021-10-14 VITALS — WEIGHT: 200 LBS | HEIGHT: 67 IN | BODY MASS INDEX: 31.39 KG/M2

## 2021-10-14 DIAGNOSIS — M25.562 PAIN IN RIGHT KNEE: ICD-10-CM

## 2021-10-14 DIAGNOSIS — M17.11 UNILATERAL PRIMARY OSTEOARTHRITIS, RIGHT KNEE: ICD-10-CM

## 2021-10-14 DIAGNOSIS — M25.561 PAIN IN RIGHT KNEE: ICD-10-CM

## 2021-10-14 DIAGNOSIS — Z72.3 LACK OF PHYSICAL EXERCISE: ICD-10-CM

## 2021-10-14 PROCEDURE — 99214 OFFICE O/P EST MOD 30 MIN: CPT | Mod: 25

## 2021-10-14 PROCEDURE — 20610 DRAIN/INJ JOINT/BURSA W/O US: CPT | Mod: LT

## 2021-10-14 PROCEDURE — 73564 X-RAY EXAM KNEE 4 OR MORE: CPT | Mod: 50

## 2021-10-15 NOTE — HISTORY OF PRESENT ILLNESS
[de-identified] : 61-year-old woman presents for pain in her left knee greater than right knee that started a couple years ago but has gotten much worse in the last couple weeks.  It is very painful with stairs.  Pain can be a 6-9 out of 10 and sharp..  It is better with a lot of walking and bending and stairs and better with rest.  She takes Tylenol for pain but it does not help very much.  She fell down some stairs about 2 years ago injuring her knees and back.  She did have imaging at that time.\par She cannot take NSAIDs because they cause reflux.\par She has not done any physical therapy recently and never had any injections.

## 2021-10-15 NOTE — PHYSICAL EXAM
[LE] : Sensory: Intact in bilateral lower extremities [Normal RLE] : Right Lower Extremity: No scars, rashes, lesions, ulcers, skin intact [Normal LLE] : Left Lower Extremity: No scars, rashes, lesions, ulcers, skin intact [Normal Touch] : sensation intact for touch [Normal] : Oriented to person, place, and time, insight and judgement were intact and the affect was normal [de-identified] : \par Knees\par Gait mildly antalgic\par No effusion\par No edema, ecchymoses, erythema.\par Left knee range of motion 0- 125 degrees flexion with patellofemoral crepitus and pain\par Right knee range of motion 0 to 130 degrees with crepitus\par Susu, 1A Lachman.  Negative anterior and posterior drawer.  Normal varus and valgus laxity.\par Intact extensor mechanism.\par  [de-identified] : \par X-rays of  bilateral knees weightbearing AP, lateral, merchant and Garcia views today showed tricompartmental osteoarthritis/degenerative changes with joint space narrowing and osteophytes bilateral knees most severe, bone-on-bone in the patellofemoral joint left greater than right knee.

## 2021-10-15 NOTE — PROCEDURE
[Injection] : Injection [Left] : of the left [Knee Joint] : knee joint [Joint Pain] : Joint pain [Patient] : patient [Risk] : risk [Benefits] : benefits [Alternatives] : alternatives [Bleeding] : bleeding [Infection] : infection [Allergic Reaction] : allergic reaction [Verbal Consent Obtained] : verbal consent was obtained prior to the procedure [Alcohol] : Alcohol [Betadine] : Betadine [Ethyl Chloride Spray] : ethyl chloride spray was used as a topical anesthetic [Lateral] : lateral [22] : a 22-gauge [1% Lidocaine___(mL)] : [unfilled] mL of 1% Lidocaine [Methylpred. 40mg/mL___(mL)] : [unfilled] mL of 40mg/mL methylprednisolone [Bandage Applied] : a bandage [Tolerated Well] : The patient tolerated the procedure well [None] : none [No Strenuous Activity___day(s)] : avoid strenuous activity for [unfilled] day(s)

## 2021-10-15 NOTE — ASSESSMENT
[FreeTextEntry1] : 61-year-old woman with  left greater than right knee pain for a few years but much worse recently.  I reviewed some past MRIs from 2016 and 2017 and x-rays from 2016 and today.  She has progressive tricompartmental osteoarthritis left greater than right knee that is severe.\par I discussed the diagnosis and treatment both operative and nonoperative for osteoarthritis.  We would first try nonoperative treatment.  She will try Celebrex which she took in the past and did not seem to aggravate her stomach.  She can take it for a week or 2 and then as needed.\par Heat and ice as needed.\par Weight loss to keep pressure off the knees can help.\par Strengthening exercises were given and she was referred to physical therapy.\par Today we did a steroid injection in the left knee which hopefully will give it some relief.\par We talked about hyaluronic acid injections as well as an option for the future.\par Follow-up in about 5 to 6 weeks or as needed

## 2021-11-01 ENCOUNTER — LABORATORY RESULT (OUTPATIENT)
Age: 61
End: 2021-11-01

## 2021-11-02 ENCOUNTER — TRANSCRIPTION ENCOUNTER (OUTPATIENT)
Age: 61
End: 2021-11-02

## 2021-11-03 ENCOUNTER — OUTPATIENT (OUTPATIENT)
Dept: OUTPATIENT SERVICES | Facility: HOSPITAL | Age: 61
LOS: 1 days | Discharge: HOME | End: 2021-11-03

## 2021-11-03 ENCOUNTER — APPOINTMENT (OUTPATIENT)
Dept: ORTHOPEDIC SURGERY | Facility: AMBULATORY SURGERY CENTER | Age: 61
End: 2021-11-03
Payer: COMMERCIAL

## 2021-11-03 DIAGNOSIS — Z98.890 OTHER SPECIFIED POSTPROCEDURAL STATES: Chronic | ICD-10-CM

## 2021-11-03 DIAGNOSIS — Z98.89 OTHER SPECIFIED POSTPROCEDURAL STATES: Chronic | ICD-10-CM

## 2021-11-03 PROCEDURE — 26055 INCISE FINGER TENDON SHEATH: CPT | Mod: F8

## 2021-11-12 ENCOUNTER — APPOINTMENT (OUTPATIENT)
Dept: ORTHOPEDIC SURGERY | Facility: CLINIC | Age: 61
End: 2021-11-12
Payer: COMMERCIAL

## 2021-11-12 PROCEDURE — 99024 POSTOP FOLLOW-UP VISIT: CPT

## 2021-12-01 NOTE — HISTORY OF PRESENT ILLNESS
[de-identified] : 61-year-old woman presents for f/u for pain in her left knee greater than right knee that started a couple years ago. \par Steroid injection was done in the left knee about 6 wks ago which \par She had been prescribed Celebrex to try since other NSAIDS aggravated her reflux.\par \par  Pain can be a 6-9 out of 10 and sharp..  It is better with a lot of walking and bending and stairs and better with rest.  She takes Tylenol for pain but it does not help very much.  She fell down some stairs about 2 years ago injuring her knees and back.  She did have imaging at that time.\par She cannot take NSAIDs because they cause reflux.\par She has not done any physical therapy recently and never had any injections.

## 2021-12-01 NOTE — PHYSICAL EXAM
[LE] : Sensory: Intact in bilateral lower extremities [Normal RLE] : Right Lower Extremity: No scars, rashes, lesions, ulcers, skin intact [Normal LLE] : Left Lower Extremity: No scars, rashes, lesions, ulcers, skin intact [Normal Touch] : sensation intact for touch [Normal] : Oriented to person, place, and time, insight and judgement were intact and the affect was normal [de-identified] : \par Knees\par Gait mildly antalgic\par No effusion\par No edema, ecchymoses, erythema.\par Left knee range of motion 0- 125 degrees flexion with patellofemoral crepitus and pain\par Right knee range of motion 0 to 130 degrees with crepitus\par Susu, 1A Lachman.  Negative anterior and posterior drawer.  Normal varus and valgus laxity.\par Intact extensor mechanism.\par  [de-identified] : \par X-rays of  bilateral knees weightbearing AP, lateral, merchant and Garcia views today showed tricompartmental osteoarthritis/degenerative changes with joint space narrowing and osteophytes bilateral knees most severe, bone-on-bone in the patellofemoral joint left greater than right knee.

## 2021-12-02 ENCOUNTER — APPOINTMENT (OUTPATIENT)
Dept: ORTHOPEDIC SURGERY | Facility: CLINIC | Age: 61
End: 2021-12-02

## 2021-12-22 ENCOUNTER — APPOINTMENT (OUTPATIENT)
Dept: ORTHOPEDIC SURGERY | Facility: CLINIC | Age: 61
End: 2021-12-22
Payer: COMMERCIAL

## 2021-12-22 DIAGNOSIS — M65.341 TRIGGER FINGER, RIGHT RING FINGER: ICD-10-CM

## 2021-12-22 PROCEDURE — 99024 POSTOP FOLLOW-UP VISIT: CPT

## 2023-04-03 ENCOUNTER — APPOINTMENT (OUTPATIENT)
Dept: ORTHOPEDIC SURGERY | Facility: CLINIC | Age: 63
End: 2023-04-03
Payer: OTHER MISCELLANEOUS

## 2023-04-03 VITALS — WEIGHT: 198 LBS | HEIGHT: 66 IN | BODY MASS INDEX: 31.82 KG/M2

## 2023-04-03 PROCEDURE — 20610 DRAIN/INJ JOINT/BURSA W/O US: CPT | Mod: 59,LT

## 2023-04-03 PROCEDURE — 73564 X-RAY EXAM KNEE 4 OR MORE: CPT | Mod: LT

## 2023-04-03 PROCEDURE — 99213 OFFICE O/P EST LOW 20 MIN: CPT | Mod: 25

## 2023-04-03 RX ORDER — OXYCODONE AND ACETAMINOPHEN 5; 325 MG/1; MG/1
5-325 TABLET ORAL
Qty: 10 | Refills: 0 | Status: COMPLETED | COMMUNITY
Start: 2021-11-02 | End: 2023-04-03

## 2023-04-03 RX ORDER — CELECOXIB 200 MG/1
200 CAPSULE ORAL DAILY
Qty: 20 | Refills: 0 | Status: COMPLETED | COMMUNITY
Start: 2021-10-14 | End: 2023-04-03

## 2023-04-03 NOTE — PROCEDURE
[Injection] : Injection [Left] : of the left [Knee Joint] : knee joint [Osteoarthritis] : Osteoarthritis [Joint Pain] : Joint pain [Patient] : patient [Risk] : risk [Benefits] : benefits [Alternatives] : alternatives [Bleeding] : bleeding [Infection] : infection [Allergic Reaction] : allergic reaction [Verbal Consent Obtained] : verbal consent was obtained prior to the procedure [Alcohol] : Alcohol [Ethyl Chloride Spray] : ethyl chloride spray was used as a topical anesthetic [Lateral] : lateral [Anterior] : anterior [22] : a 22-gauge [1% Lidocaine___(mL)] : [unfilled] mL of 1% Lidocaine [Triamcinolone 40mg/mL___(mL)] : [unfilled] ~UmL of 40mg/mL triamcinolone [Bandage Applied] : a bandage [Tolerated Well] : The patient tolerated the procedure well [None] : none [No Strenuous Activity___day(s)] : avoid strenuous activity for [unfilled] day(s) [___ Week(s)] : in [unfilled] week(s) [FreeTextEntry1] : ChloraPrep

## 2023-04-03 NOTE — ASSESSMENT
[FreeTextEntry1] : 63-year-old with a couple weeks of left knee pain that started 2 days after she had fallen at work.  She has underlying severe osteoarthritis in her knee likely aggravated by the fall on it.  No fractures were seen on x-rays today.  There could be bone bruising.  She likely could have some meniscus tearing given the severity of the arthritis I would expect degenerative tears.\par I offered a steroid injection since she cannot take NSAIDs because they cause heartburn.  She can take Tylenol for pain.  Heat and ice and rest.  She was given a cane for ambulation.\par I will see her back in 2 weeks.\par 100% temporary impairment right now.  She will not work for in a couple weeks to allow her to rest and recover.\par With the severity of her arthritis which likely will progress over time and become more chronically symptomatic she likely will be a candidate for knee replacement at some point in the future but I would not consider that with the acute pain that she is currently experiencing.  Hyaluronic acid injections may be considered as well at some point in time.

## 2023-04-03 NOTE — REASON FOR VISIT
[Follow-Up Visit] : a follow-up visit for [Workers' Comp: Date of Injury: _______] : This visit is related to worker's compensation. Date of Injury: [unfilled] [Knee Injury] : knee injury [Knee Pain] : knee pain

## 2023-04-03 NOTE — HISTORY OF PRESENT ILLNESS
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 She had fallen at work on 3/15/23 when she slipped on a wet mat. The knee pain didn’t start until a couple days later. Her pain is localized over medial knee. She notes it feels unstable when walking and she is limping. No significant swelling. She describes pain as sharp and constant, 8/10 pain. She has been taking Tylenol but didn't take any coming to work today.\par She has been working since the injury. \par She has a history of LEFT knee arthritis and had a steroid injection in October 2021.\par I had seen her for knee arthritis and pain a couple years ago and did a steroid injection in Oct 2021 which she doesn't remember if it was helpful.

## 2023-04-03 NOTE — PHYSICAL EXAM
[LE] : Sensory: Intact in bilateral lower extremities [Normal RLE] : Right Lower Extremity: No scars, rashes, lesions, ulcers, skin intact [Normal LLE] : Left Lower Extremity: No scars, rashes, lesions, ulcers, skin intact [Normal Touch] : sensation intact for touch [Normal] : Gait: normal [de-identified] : \par Left Knee / right for comparison\par Gait moderately antalgic\par No effusion\par No edema, ecchymoses, erythema. Skin is intact.\par Left knee range of motion 0- 125 degrees flexion with patellofemoral crepitus and pain\par Right knee range of motion 0 to 130 degrees with crepitus and less pain\par Susu, 1A Lachman.  Negative anterior and posterior drawer.  Normal varus and valgus laxity.\par Intact extensor mechanism.\par Tender medial > lateral joint line\par NVI [de-identified] : \par \par X-rays taken today of LEFT knee weightbearing 4 views compared to bilateral x-rays on 10/14/2021 showed progressive tricompartmental osteoarthritis/degenerative changes with joint space narrowing and osteophytes most severe, bone on bone in the patellofemoral joint LEFT and moderately severe medial and lateral compartments joint space narrowing and osteophytes.

## 2023-04-17 ENCOUNTER — APPOINTMENT (OUTPATIENT)
Dept: ORTHOPEDIC SURGERY | Facility: CLINIC | Age: 63
End: 2023-04-17
Payer: OTHER MISCELLANEOUS

## 2023-04-17 ENCOUNTER — FORM ENCOUNTER (OUTPATIENT)
Age: 63
End: 2023-04-17

## 2023-04-17 PROCEDURE — 99213 OFFICE O/P EST LOW 20 MIN: CPT

## 2023-04-17 NOTE — REASON FOR VISIT
[Follow-Up Visit] : a follow-up visit for [Workers' Comp: Date of Injury: _______] : This visit is related to worker's compensation. Date of Injury: [unfilled] [Knee Injury] : knee injury

## 2023-04-17 NOTE — HISTORY OF PRESENT ILLNESS
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 The knee pain didn’t start until a couple days later. \par Her pain is 7/10 worse walking. she is using a cane.\par She is doing the home exercises I have given her to strengthen around the knee.  She is taking Tylenol.  NSAIDs aggravate her stomach.\par She had a steroid injection last visit. It helped a little but she still has pain and is limping.  Pain is anterior and medial.\par She has been out of work since I saw her. \par

## 2023-04-17 NOTE — ASSESSMENT
[FreeTextEntry1] : 63-year-old with left knee pain that started 2 days after she had fallen at work on 3/15/23 with underlying severe osteoarthritis\par A steroid injection was done last visit since she cannot take NSAIDs because they cause heartburn.  She can take Tylenol for pain.  Heat and ice and rest.  She was given a cane for ambulation.\par Her pain is only slightly better.  She still has severe pain and difficulty walking which is needed for her job.\par I have referred her for an MRI to see if there is any tears or occult fracture given the severity of her ongoing pain.  There may just be an aggravation of underlying osteoarthritis that is severe at the patellofemoral joint.\par I will see her back in 2 weeks.\par She should go for formal physical therapy.\par Cane as needed.\par 100% temporary impairment right now.  She will not work for in a couple weeks to allow her to rest and recover.\par Follow-up in about 2 to 3 weeks after the MRI is hopefully done to review and see if she is improving and decide on further treatment.\par

## 2023-04-17 NOTE — PHYSICAL EXAM
[LE] : Sensory: Intact in bilateral lower extremities [Normal RLE] : Right Lower Extremity: No scars, rashes, lesions, ulcers, skin intact [Normal LLE] : Left Lower Extremity: No scars, rashes, lesions, ulcers, skin intact [Normal Touch] : sensation intact for touch [Normal] : Oriented to person, place, and time, insight and judgement were intact and the affect was normal [de-identified] : \par Left Knee / right for comparison\par Gait moderately antalgic\par No effusion\par No edema, ecchymoses, erythema. Skin is intact.\par Left knee range of motion  5 degrees hyperextension- 130 degrees flexion with severe patellofemoral crepitus and pain\par Right knee range of motion 0 to 130 degrees with crepitus and less pain\par Susu, 1A Lachman.  Negative anterior and posterior drawer.  Normal varus and valgus laxity.\par Intact extensor mechanism.  Mildly positive apprehension with patella excursion which is increased with hypermobile patella and severe pain with compression patellofemoral joint with grinding\par Tender medial > lateral joint line\par NVI [de-identified] : \par \par X-rays taken 4/3/23 of LEFT knee weightbearing 4 views showed progressive tricompartmental osteoarthritis/degenerative changes with joint space narrowing and osteophytes most severe, bone on bone in the patellofemoral joint LEFT and moderately severe medial and lateral compartments joint space narrowing and osteophytes.

## 2023-05-01 ENCOUNTER — APPOINTMENT (OUTPATIENT)
Dept: ORTHOPEDIC SURGERY | Facility: CLINIC | Age: 63
End: 2023-05-01
Payer: OTHER MISCELLANEOUS

## 2023-05-01 PROCEDURE — 99213 OFFICE O/P EST LOW 20 MIN: CPT

## 2023-05-01 RX ORDER — HYALURONATE SODIUM, STABILIZED 88 MG/4 ML
88 SYRINGE (ML) INTRAARTICULAR
Qty: 1 | Refills: 0 | Status: ACTIVE | OUTPATIENT
Start: 2023-05-01

## 2023-05-01 NOTE — PHYSICAL EXAM
[LE] : Sensory: Intact in bilateral lower extremities [Normal RLE] : Right Lower Extremity: No scars, rashes, lesions, ulcers, skin intact [Normal LLE] : Left Lower Extremity: No scars, rashes, lesions, ulcers, skin intact [Normal Touch] : sensation intact for touch [Normal] : Gait: normal [de-identified] : \par Left Knee / right for comparison\par Gait mild to moderately antalgic slightly better than last visit\par No effusion\par No edema, ecchymoses, erythema. Skin is intact.\par Left knee range of motion  5 degrees hyperextension- 130 degrees flexion with severe patellofemoral crepitus and pain at extremes of flexion\par Right knee range of motion 0 to 130 degrees with crepitus and less pain\par Susu, 1A Lachman.  Negative anterior and posterior drawer.  Normal varus and valgus laxity.\par Intact extensor mechanism.  Mildly positive apprehension with patella excursion which is increased with hypermobile patella and severe pain with compression patellofemoral joint with grinding\par Tender medial > lateral joint line\par Mild pain with Susu\par NVI [de-identified] : \par \par X-rays taken 4/3/23 of LEFT knee weightbearing 4 views compared to bilateral xrays on 10/14/2021 showed tricompartmental osteoarthritis/degenerative changes with joint space narrowing and osteophytes bilateral knees most severe, bone on bone in the patellofemoral joint LEFT greater than RIGHT knee.\par \par MRI of the left knee performed April 21, 2023 showed severe degenerative advanced changes patellofemoral joint with cartilage wear.  More moderate degenerative changes through the medial compartment.\par There is extrusion of the medial meniscus and small area of bone marrow edema in the medial femoral condyle likely degenerative and a degenerative signal with oblique tear nondisplaced posterior horn medial meniscus.  No significant effusion and no Baker's cyst.  Mild degenerative changes lateral compartment.  There is a loose body anterior to the lateral meniscus

## 2023-05-01 NOTE — ASSESSMENT
[FreeTextEntry1] : 63-year-old woman with severe osteoarthritis in her left knee greatest in the patellofemoral compartment but also moderate to severe in the medial compartment with pain since she fell about 6 weeks ago greatest anteromedial knee.\par The MRI did show a small loose body in the lateral side of her knee but symptoms do not seem consistent with a loose body causing locking.  She does get intermittent severe pain.\par Arthroscopic surgery could be considered for the meniscus tear and loose body but she would still have the severe arthritis and there is a good chance that she would continue to have significant knee pain from the arthritis.\par I would like to see her try some physical therapy.  I would order hyaluronic acid injections as well since she only got partial relief from the steroid injections.\par She should be doing the home exercises and try to get into physical therapy as soon as possible to try to rehabilitate her knee which could help.\par I will see her back in 3 to 4 weeks and hopefully we have the gel shots.\par If she seems to be having a lot of mechanical symptoms that may be consistent with a loose body moving around the knee and causing locking then this may be an indication to consider arthroscopic surgery despite the degree of arthritis in her knee.  At some point knee replacement may be a consideration.  I did give her names of orthopedic surgeons to do knee replacements to get their opinion as well.\par Continue with Tylenol or topical Voltaren gel for pain.\par She is not able to work right now.  There is 100% temporary impairment.\par Follow-up 3 to 4 weeks.

## 2023-05-01 NOTE — HISTORY OF PRESENT ILLNESS
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 \par She has not been working since 4/3/23 when I saw her.  She has been at home.  She has not started physical therapy yet because she has not found a place.  She did go for the MRI.  She had steroid injection which provided some partial pain relief but she is still having moderate to severe pain in the knee aggravated by activities.  She is walking without assistive devices.\par She did have some chronic pain from arthritis in this knee prior to the fall which caused the more acute pain.\par She takes Tylenol for pain.  She cannot take NSAIDs because it really aggravates her stomach.\par Her current pain is 5 out of 10 but intermittently is 7 out of 10.

## 2023-05-22 ENCOUNTER — FORM ENCOUNTER (OUTPATIENT)
Age: 63
End: 2023-05-22

## 2023-05-30 NOTE — REASON FOR VISIT
[Workers' Comp: Date of Injury: _______] : This visit is related to worker's compensation. Date of Injury: [unfilled] [Knee Injury] : knee injury

## 2023-05-31 ENCOUNTER — APPOINTMENT (OUTPATIENT)
Dept: ORTHOPEDIC SURGERY | Facility: CLINIC | Age: 63
End: 2023-05-31
Payer: OTHER MISCELLANEOUS

## 2023-05-31 DIAGNOSIS — M25.562 PAIN IN LEFT KNEE: ICD-10-CM

## 2023-05-31 PROCEDURE — 20610 DRAIN/INJ JOINT/BURSA W/O US: CPT | Mod: 59,LT

## 2023-05-31 PROCEDURE — 99213 OFFICE O/P EST LOW 20 MIN: CPT | Mod: 25

## 2023-05-31 NOTE — PROCEDURE
[Injection] : Injection [Left] : of the left [Knee Joint] : knee joint [Inflammation] : Inflammation [Patient] : patient [Risk] : risk [Benefits] : benefits [Alternatives] : alternatives [Bleeding] : bleeding [Infection] : infection [Allergic Reaction] : allergic reaction [Verbal Consent Obtained] : verbal consent was obtained prior to the procedure [Alcohol] : Alcohol [Ethyl Chloride Spray] : ethyl chloride spray was used as a topical anesthetic [Lateral] : lateral [20] : a 20-gauge [Bandage Applied] : a bandage [Tolerated Well] : The patient tolerated the procedure well [None] : none [No Strenuous Activity___day(s)] : avoid strenuous activity for [unfilled] day(s) [___ Week(s)] : in [unfilled] week(s) [FreeTextEntry1] : chloraprep [FreeTextEntry8] : [facility] Lot # [id number], 6/30/25

## 2023-05-31 NOTE — HISTORY OF PRESENT ILLNESS
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 \par She has not been working since 4/3/23 when I saw her. \par She has started PT and feels its really been helping and she is starting to feel better.  She had steroid injection which provided some partial pain relief but she is still having moderate to severe pain in the knee aggravated by activities.  She has been walking without cane since the knee pain has decreased some. She did have some chronic pain from arthritis in this knee prior to the fall which caused the more acute pain. She takes Tylenol for pain occasionally but not consistent.  Her current pain is 4/10. She cannot take NSAIDs because it really aggravates her stomach. She wants to do the HA injection which I will do today.

## 2023-05-31 NOTE — ASSESSMENT
[FreeTextEntry1] : 63-year-old woman with severe osteoarthritis in her left knee greatest in the patellofemoral compartment but also moderate to severe in the medial compartment with pain since she fell about 6 weeks ago greatest anteromedial knee.\par The MRI did show a small loose body in the lateral side of her knee but symptoms do not seem consistent with a loose body causing locking.]\par Her knee is feeling better with the physical therapy and time.  She is more encouraged today than she had been.  Today we did a hyaluronic acid injection which will hopefully give her more relief.  Heat and ice.  Continue physical therapy.  She is done about 2 weeks and has another 4 weeks.  She will be off work the next several weeks so she can do more of the therapy and home exercises and then can go back to work June 20.  She may be on vacation at that time so she may have a little extra time as well after that but I think she should be ready by then to return to work.  She should follow-up in about 5 weeks to see how she is doing.\par Currently there is 100% temporary impairment.  Return to work 6/20/2025

## 2023-05-31 NOTE — PHYSICAL EXAM
[LE] : Sensory: Intact in bilateral lower extremities [Normal RLE] : Right Lower Extremity: No scars, rashes, lesions, ulcers, skin intact [Normal LLE] : Left Lower Extremity: No scars, rashes, lesions, ulcers, skin intact [Normal Touch] : sensation intact for touch [Normal] : Oriented to person, place, and time, insight and judgement were intact and the affect was normal [de-identified] : \par Left Knee / right for comparison\par Gait mildly antalgic slightly better than last visit\par No effusion\par No edema, ecchymoses, erythema. Skin is intact.\par Left knee range of motion  5 degrees hyperextension- 130 degrees flexion with patellofemoral crepitus and mild pain at extremes of flexion\par Right knee range of motion 0 to 130 degrees with crepitus and less pain\par Susu, 1A Lachman.  Negative anterior and posterior drawer.  Normal varus and valgus laxity.\par Intact extensor mechanism.  Mildly positive\par Tender medial > lateral joint line\par Mild pain with Susu\par NVI [de-identified] : \par X-rays taken 4/3/23 of LEFT knee weightbearing 4 views compared to bilateral xrays on 10/14/2021 showed tricompartmental osteoarthritis/degenerative changes with joint space narrowing and osteophytes bilateral knees most severe, bone on bone in the patellofemoral joint LEFT greater than RIGHT knee.\par \par MRI of the left knee performed April 21, 2023 showed severe degenerative advanced changes patellofemoral joint with cartilage wear.  More moderate degenerative changes through the medial compartment.\par There is extrusion of the medial meniscus and small area of bone marrow edema in the medial femoral condyle likely degenerative and a degenerative signal with oblique tear nondisplaced posterior horn medial meniscus.  No significant effusion and no Baker's cyst.  Mild degenerative changes lateral compartment.  There is a loose body anterior to the lateral meniscus

## 2023-06-09 NOTE — ED PROVIDER NOTE - TEMPLATE, MLM
Date of Discharge:  6/9/2023    Discharge Diagnosis: s/p hysterectomy,  Fibromyalgia flare and POTS syndrome symptoms    Presenting Problem/History of Present Illness  Adenomyosis of the uterus [N80.03]     Which did not respond to novasure endometrial abltaion. The pain got worse and she had a miserable course after the ablation with pain.  So much so that she requested to proceed with definited surgical management by hysterectomy.   Hospital Course  Patient is a 51 y.o. female had her surgery and all was better than expected with minimal scar tissue.  Laparosopic  MIS.  She has fibromyalgia and was feeling puny so she was keep another day,  then her fibromyalgia really flared and PT was evluated and recommended intense therapy inpatient.  Finally on day number 4 she was stable with her fibro and agreeable to continue PT / home management of her fibromyalgia.     Procedures Performed  Procedure(s):  TOTAL LAPAROSCOPIC HYSTERECTOMY BILATERAL SALPINGOOPHORECTOMY WITH DAVINCI ROBOT, CYSTOSCOPY, TENSION FREE VAGINAL TAPING SUBURETHRAL SLING       Consults:   Consults       Date and Time Order Name Status Description    6/7/2023  8:15 AM Inpatient Hospitalist Consult Completed             Pertinent Test Results:  Labs:    Lab Results (last 72 hours)       Procedure Component Value Units Date/Time    Tissue Pathology Exam [[id number]] Collected: 06/06/23 0841    Specimen: Tissue from Uterus with Cervix, Bilateral Tubes and Ovaries Updated: 06/07/23 1500     Case Report --     Surgical Pathology Report                         Case: [id number]                                  Authorizing Provider:  Delmy Kelly,   Collected:           06/06/2023 08:41 AM                                 MD                                                                           Ordering Location:     [facility]   Received:            06/06/2023 10:32 AM                                 OR                             "                                               Pathologist:           Bobby Saldana MD                                                        Specimen:    Uterus with Cervix, Bilateral Tubes and Ovaries                                             Clinical Information --     Abnormal bleeding time         Final Diagnosis --     UTERUS, CERVIX, BILATERAL OVARIES AND FALLOPIAN TUBES, HYSTERECTOMY AND BILATERAL SALPINGO-OOPHORECTOMY:  Cervix: Mild chronic cervicitis.  Endometrium: Changes suggestive of prior ablation with limited residual viable endometrium present with no hyperplasia or atypia identified.  Myometrium: No significant histopathologic change.  Right ovary: Benign follicular cysts.  Left ovary: Benign physiologic ovary.  Bilateral fallopian tubes: No significant histopathologic change.       Gross Description --     1. Uterus with Cervix, Bilateral Tubes and Ovaries.  Received in formalin labeled \"uterus with cervix, bilateral tubes and ovaries\" is an intact simple hysterectomy specimen with attached cervix and bilateral fallopian tubes and ovaries.  The uterine body is surfaced by tan smooth serosa and a 6.7 x 6.5 x 4.7 cm.  The smooth ectocervix is 3.5 x 3.5 cm and has a patent os measuring 1 cm in diameter.  The uterine body and cervix is 128.9 g.  The tan, corrugated endocervical canal is 3.5 cm long and contains a moderate amount of blood-tinged mucus.  No polyps or invasive lesions are present.  The 5.5 x 3.5 cm triangular endometrial cavity is surfaced by friable, necrotic tissue.  No normal endometrium is identified.  The myometrium is tan and trabecular and averages 2.5 cm thick.  No intramural, subserosal or submucosal nodules are present.  The right, cystic ovary is 18.2 g and 5.2 x 3.5 x 2.4 cm sectioning reveals tan stroma and a 3.5 cm in greatest dimension unilocular, smooth-walled cyst.  The left, cerebriform ovary is 1.9 g and 3.5 x 1.5 x 1.2 cm and sectioning reveals unremarkable " tan-pink stroma with 2 serous cyst measuring less than 1 cm in greatest dimension.  The bilateral fimbriated fallopian tubes are mildly tortuous and average 5 cm long by 0.7 cm in diameter.  Sectioning reveals an unremarkable lumen.  Representative sections are submitted as follows:    1A: Anterior and posterior cervix  1D-1C: Anterior endomyometrium (full-thickness in 1B)  1D-1E: Posterior endomyometrium (full-thickness in 1D)  1F: Right ovary  1G: Right fallopian tube with fimbria  1H: Left ovary  1I: Left fallopian tube with fimbria   HDM         Microscopic Description --     The slides are reviewed and demonstrate histopathologic features supporting the above rendered diagnosis.        Basic Metabolic Panel [[id number]]  (Abnormal) Collected: 06/07/23 0611    Specimen: Blood Updated: 06/07/23 0654     Glucose 88 mg/dL      BUN 8 mg/dL      Creatinine 0.81 mg/dL      Sodium 141 mmol/L      Potassium 4.1 mmol/L      Chloride 110 mmol/L      CO2 24.0 mmol/L      Calcium 8.4 mg/dL      BUN/Creatinine Ratio 9.9     Anion Gap 7.0 mmol/L      eGFR 88.6 mL/min/1.73     Narrative:      GFR Normal >60  Chronic Kidney Disease <60  Kidney Failure <15      CBC (No Diff) [[id number]]  (Abnormal) Collected: 06/07/23 0611    Specimen: Blood Updated: 06/07/23 0645     WBC 9.12 10*3/mm3      RBC 3.24 10*6/mm3      Hemoglobin 10.5 g/dL      Hematocrit 32.6 %      .6 fL      MCH 32.4 pg      MCHC 32.2 g/dL      RDW 13.1 %      RDW-SD 48.2 fl      MPV 10.4 fL      Platelets 205 10*3/mm3             Condition on Discharge:  good    Vital Signs  Temp:  [96.7 °F (35.9 °C)-98.4 °F (36.9 °C)] 97.6 °F (36.4 °C)  Heart Rate:  [73-85] 74  Resp:  [16-17] 16  BP: ()/(54-64) 100/64    Discharge Disposition  Home or Self Care with home PT    Discharge Medications     Discharge Medications        Continue These Medications        Instructions Start Date   acetaminophen 500 MG tablet  Commonly known as: TYLENOL   1,000 mg, Oral, Every  6 Hours PRN      atomoxetine 80 MG capsule  Commonly known as: STRATTERA   80 mg, Oral, Daily      dexlansoprazole 60 MG capsule  Commonly known as: DEXILANT   60 mg, Oral, Daily      DULoxetine 60 MG capsule  Commonly known as: CYMBALTA   1 capsule, Oral, Every 12 Hours Scheduled      hydroxychloroquine 200 MG tablet  Commonly known as: PLAQUENIL   200 mg, Oral, Daily      ibuprofen 600 MG tablet  Commonly known as: ADVIL,MOTRIN   600 mg, Oral, Every 6 Hours PRN      ivabradine HCl 7.5 MG tablet tablet  Commonly known as: CORLANOR   7.5 mg, Oral, 2 Times Daily With Meals      Lifitegrast 5 % ophthalmic solution  Commonly known as: XIIDRA   1 drop, Both Eyes, 2 Times Daily      linaclotide 145 MCG capsule capsule  Commonly known as: LINZESS   145 mcg, Oral, Every Morning Before Breakfast      metoprolol succinate XL 25 MG 24 hr tablet  Commonly known as: TOPROL-XL   1 tablet, Oral, Daily      Northera 100 MG capsule  Generic drug: Droxidopa   3 Times Daily, 18 pills a day       promethazine 25 MG tablet  Commonly known as: PHENERGAN   25 mg, Oral, Every 6 Hours PRN      temazepam 7.5 MG capsule  Commonly known as: RESTORIL   7.5 mg, Oral, Nightly PRN      Topiramate  MG capsule sustained-release 24 hr   1 capsule, Oral, Daily      traMADol  MG 24 hr tablet  Commonly known as: ULTRAM-ER   200 mg, Oral, Daily PRN      vitamin B-12 500 MCG tablet  Commonly known as: CYANOCOBALAMIN   500 mcg, Oral, Daily      Vitamin D3 1.25 MG (54113 UT) capsule   Vitamin D3      VRAYLAR PO   1 tablet, Oral, Daily, 1.5 mg                Discharge Diet: regular    Activity at Discharge: pelvi rest, lifting restrictions.     Follow-up Appointments:  No future appointments. Pt has 2 follow up appts,  2 and 6 weeks postop    Test Results Pending at Discharge  nothing pending.       Delmy Kelly MD  06/09/23  07:42 EDT       Fall

## 2023-07-10 ENCOUNTER — APPOINTMENT (OUTPATIENT)
Dept: ORTHOPEDIC SURGERY | Facility: CLINIC | Age: 63
End: 2023-07-10
Payer: OTHER MISCELLANEOUS

## 2023-07-10 DIAGNOSIS — S83.242A OTHER TEAR OF MEDIAL MENISCUS, CURRENT INJURY, LEFT KNEE, INITIAL ENCOUNTER: ICD-10-CM
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
## 2023-07-10 PROCEDURE — 99213 OFFICE O/P EST LOW 20 MIN: CPT

## 2023-07-10 NOTE — HISTORY OF PRESENT ILLNESS
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 \par Last visit she had a Monovisc injection which has been somewhat helpful. She is less constant pain but still aware of it and still gets pain at times. She has been taking Tylenol occasionally. No swelling. She returned to work on 6/20/23 full duty and its been ok w/ no if it can complaints of pain. She finished physical therapy about 3 weeks ago and is now doing home exercises. \par

## 2023-07-10 NOTE — ASSESSMENT
[FreeTextEntry1] : 63-year-old woman with left knee severe osteoarthritis greatest in the patellofemoral compartment but also moderate to severe in the medial compartment with pain since she fell about 6 weeks ago greatest anteromedial knee.\par The MRI did show a small loose body in the lateral side of her knee but symptoms do not seem consistent with a loose body causing locking.\par Her knee is feeling much better now after doing the physical therapy, rest and doing steroid and hyaluronic acid injections.  She is really back to baseline at this point.  No mechanical symptoms.\par With the severe osteoarthritis I think at some point she may likely need knee replacement surgery.  This was present before her Worker's Comp. injury.\par At this point I feel she has reached maximal medical improvement.\par 5% loss of use.\par She has names of joint replacement specialist if she wants to see them at some point in time.  If her knee is doing better we could do the hyaluronic acid injections every 6 months or more.  Continue with strengthening.  Ibuprofen as needed.

## 2023-07-10 NOTE — PHYSICAL EXAM
[LE] : Sensory: Intact in bilateral lower extremities [Normal RLE] : Right Lower Extremity: No scars, rashes, lesions, ulcers, skin intact [Normal LLE] : Left Lower Extremity: No scars, rashes, lesions, ulcers, skin intact [Normal Touch] : sensation intact for touch [Normal] : Oriented to person, place, and time, insight and judgement were intact and the affect was normal [de-identified] : Left Knee / right for comparison\par Nonantalgic gait over short distance\par No effusion\par No edema, ecchymoses, erythema. Skin is intact.\par Left knee range of motion 5 degrees hyperextension- 130 degrees flexion with patellofemoral crepitus and mild pain at extremes of flexion\par Right knee range of motion 0 to 130 degrees with crepitus and less pain\par Susu, 1A Lachman. Negative anterior and posterior drawer. Normal varus and valgus laxity.\par Intact extensor mechanism. Mildly positive\par Tender medial > lateral joint line\par Mild pain with Susu\par NVI  [de-identified] : \par X-rays taken 4/3/23 of LEFT knee weightbearing 4 views compared to bilateral xrays on 10/14/2021 showed tricompartmental osteoarthritis/degenerative changes with joint space narrowing and osteophytes bilateral knees most severe, bone on bone in the patellofemoral joint LEFT greater than RIGHT knee.\par \par MRI of the left knee performed April 21, 2023 showed severe degenerative advanced changes patellofemoral joint with cartilage wear. More moderate degenerative changes through the medial compartment.\par There is extrusion of the medial meniscus and small area of bone marrow edema in the medial femoral condyle likely degenerative and a degenerative signal with oblique tear nondisplaced posterior horn medial meniscus. No significant effusion and no Baker's cyst. Mild degenerative changes lateral compartment. There is a loose body anterior to the lateral meniscus. \par

## 2023-08-18 ENCOUNTER — APPOINTMENT (OUTPATIENT)
Dept: ORTHOPEDIC SURGERY | Facility: CLINIC | Age: 63
End: 2023-08-18

## 2023-08-18 DIAGNOSIS — M17.12 UNILATERAL PRIMARY OSTEOARTHRITIS, LEFT KNEE: ICD-10-CM

## 2023-08-28 NOTE — PHYSICAL EXAM
[LE] : Sensory: Intact in bilateral lower extremities [Normal RLE] : Right Lower Extremity: No scars, rashes, lesions, ulcers, skin intact [Normal LLE] : Left Lower Extremity: No scars, rashes, lesions, ulcers, skin intact [Normal Touch] : sensation intact for touch [Normal] : Oriented to person, place, and time, insight and judgement were intact and the affect was normal [de-identified] : Left Knee / right for comparison\par  Nonantalgic gait over short distance\par  No effusion\par  No edema, ecchymoses, erythema. Skin is intact.\par  Left knee range of motion 5 degrees hyperextension- 130 degrees flexion with patellofemoral crepitus and mild pain at extremes of flexion\par  Right knee range of motion 0 to 130 degrees with crepitus and less pain\par  Susu, 1A Lachman. Negative anterior and posterior drawer. Normal varus and valgus laxity.\par  Intact extensor mechanism. Mildly positive\par  Tender medial > lateral joint line\par  Mild pain with Susu\par  NVI  [de-identified] : \par  X-rays taken 4/3/23 of LEFT knee weightbearing 4 views compared to bilateral xrays on 10/14/2021 showed tricompartmental osteoarthritis/degenerative changes with joint space narrowing and osteophytes bilateral knees most severe, bone on bone in the patellofemoral joint LEFT greater than RIGHT knee.\par  \par  MRI of the left knee performed April 21, 2023 showed severe degenerative advanced changes patellofemoral joint with cartilage wear.  More moderate degenerative changes through the medial compartment.\par  There is extrusion of the medial meniscus and small area of bone marrow edema in the medial femoral condyle likely degenerative and a degenerative signal with oblique tear nondisplaced posterior horn medial meniscus.  No significant effusion and no Baker's cyst.  Mild degenerative changes lateral compartment.  There is a loose body anterior to the lateral meniscus

## 2023-08-28 NOTE — HISTORY OF PRESENT ILLNESS
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 \par  Last visit she had a Monovisc injection which has been somewhat helpful. She is less constant pain but still aware of it and still gets pain at times. She has been taking Tylenol occasionally. No swelling. She returned to work on 6/20/23 full duty and its been ok w/ no if it can complaints of pain. She finished physical therapy about 3 weeks ago and is now doing home exercises. \par

## 2023-08-28 NOTE — ASSESSMENT
[FreeTextEntry1] : 63-year-old woman with left knee severe osteoarthritis greatest in the patellofemoral compartment but also moderate to severe in the medial compartment with pain since she fell about 4.5 mos ago greatest anteromedial knee. The MRI did show a small loose body in the lateral side of her knee but symptoms do not seem consistent with a loose body causing locking. H

## 2024-10-22 NOTE — PATIENT PROFILE ADULT. - FUNCTIONAL SCREEN CURRENT LEVEL: SWALLOWING (IF SCORE 2 OR MORE FOR ANY ITEM, CONSULT REHAB SERVICES), MLM)
Noted hx  Has meth in her system  Consult psych when appropriate  PEC'd     (0) swallows foods/liquids without difficulty

## 2025-01-07 NOTE — ASSESSMENT
Problem: PAIN - ADULT  Goal: Verbalizes/displays adequate comfort level or baseline comfort level  Description: Interventions:  - Encourage patient to monitor pain and request assistance  - Assess pain using appropriate pain scale  - Administer analgesics based on type and severity of pain and evaluate response  - Implement non-pharmacological measures as appropriate and evaluate response  - Consider cultural and social influences on pain and pain management  - Notify physician/advanced practitioner if interventions unsuccessful or patient reports new pain  Outcome: Progressing     Problem: INFECTION - ADULT  Goal: Absence or prevention of progression during hospitalization  Description: INTERVENTIONS:  - Assess and monitor for signs and symptoms of infection  - Monitor lab/diagnostic results  - Monitor all insertion sites, i.e. indwelling lines, tubes, and drains  - Monitor endotracheal if appropriate and nasal secretions for changes in amount and color  - Atlas appropriate cooling/warming therapies per order  - Administer medications as ordered  - Instruct and encourage patient and family to use good hand hygiene technique  - Identify and instruct in appropriate isolation precautions for identified infection/condition  Outcome: Progressing     Problem: SAFETY ADULT  Goal: Patient will remain free of falls  Description: INTERVENTIONS:  - Educate patient/family on patient safety including physical limitations  - Instruct patient to call for assistance with activity   - Consult OT/PT to assist with strengthening/mobility   - Keep Call bell within reach  - Keep bed low and locked with side rails adjusted as appropriate  - Keep care items and personal belongings within reach  - Initiate and maintain comfort rounds  - Make Fall Risk Sign visible to staff  - Offer Toileting every 2 Hours, in advance of need  - Initiate/Maintain bed alarm  - Obtain necessary fall risk management equipment  - Apply yellow socks and  [FreeTextEntry1] : 61-year-old woman with  left greater than right knee pain for a few years but much worse recently with progressive tricompartmental osteoarthritis left greater than right knee that is severe.\par \par Follow-up in about 5 to 6 weeks or as needed bracelet for high fall risk patients  - Consider moving patient to room near nurses station  Outcome: Progressing  Goal: Maintain or return to baseline ADL function  Description: INTERVENTIONS:  -  Assess patient's ability to carry out ADLs; assess patient's baseline for ADL function and identify physical deficits which impact ability to perform ADLs (bathing, care of mouth/teeth, toileting, grooming, dressing, etc.)  - Assess/evaluate cause of self-care deficits   - Assess range of motion  - Assess patient's mobility; develop plan if impaired  - Assess patient's need for assistive devices and provide as appropriate  - Encourage maximum independence but intervene and supervise when necessary  - Involve family in performance of ADLs  - Assess for home care needs following discharge   - Consider OT consult to assist with ADL evaluation and planning for discharge  - Provide patient education as appropriate  Outcome: Progressing  Goal: Maintains/Returns to pre admission functional level  Description: INTERVENTIONS:  - Perform AM-PAC 6 Click Basic Mobility/ Daily Activity assessment daily.  - Set and communicate daily mobility goal to care team and patient/family/caregiver.   - Collaborate with rehabilitation services on mobility goals if consulted  - Perform Range of Motion 3 times a day.  - Reposition patient every 3 hours.  - Dangle patient 3 times a day  - Stand patient 3 times a day  - Ambulate patient 3 times a day  - Out of bed to chair 3 times a day   - Out of bed for meals 3 times a day  - Out of bed for toileting  - Record patient progress and toleration of activity level   Outcome: Progressing     Problem: DISCHARGE PLANNING  Goal: Discharge to home or other facility with appropriate resources  Description: INTERVENTIONS:  - Identify barriers to discharge w/patient and caregiver  - Arrange for needed discharge resources and transportation as appropriate  - Identify discharge learning needs (meds, wound care,  etc.)  - Arrange for interpretive services to assist at discharge as needed  - Refer to Case Management Department for coordinating discharge planning if the patient needs post-hospital services based on physician/advanced practitioner order or complex needs related to functional status, cognitive ability, or social support system  Outcome: Progressing     Problem: Knowledge Deficit  Goal: Patient/family/caregiver demonstrates understanding of disease process, treatment plan, medications, and discharge instructions  Description: Complete learning assessment and assess knowledge base.  Interventions:  - Provide teaching at level of understanding  - Provide teaching via preferred learning methods  Outcome: Progressing